# Patient Record
Sex: MALE | Race: WHITE | NOT HISPANIC OR LATINO | URBAN - METROPOLITAN AREA
[De-identification: names, ages, dates, MRNs, and addresses within clinical notes are randomized per-mention and may not be internally consistent; named-entity substitution may affect disease eponyms.]

---

## 2020-09-07 ENCOUNTER — EMERGENCY (EMERGENCY)
Facility: HOSPITAL | Age: 75
LOS: 1 days | Discharge: HOME | End: 2020-09-07
Attending: EMERGENCY MEDICINE
Payer: MEDICARE

## 2020-09-07 VITALS
HEART RATE: 74 BPM | OXYGEN SATURATION: 100 % | DIASTOLIC BLOOD PRESSURE: 86 MMHG | SYSTOLIC BLOOD PRESSURE: 135 MMHG | TEMPERATURE: 98 F | RESPIRATION RATE: 18 BRPM

## 2020-09-07 VITALS
HEART RATE: 92 BPM | OXYGEN SATURATION: 99 % | RESPIRATION RATE: 18 BRPM | WEIGHT: 145.06 LBS | DIASTOLIC BLOOD PRESSURE: 71 MMHG | HEIGHT: 66 IN | SYSTOLIC BLOOD PRESSURE: 125 MMHG

## 2020-09-07 LAB
ALBUMIN SERPL ELPH-MCNC: 4.1 G/DL — SIGNIFICANT CHANGE UP (ref 3.3–5.2)
ALP SERPL-CCNC: 63 U/L — SIGNIFICANT CHANGE UP (ref 40–120)
ALT FLD-CCNC: 7 U/L — SIGNIFICANT CHANGE UP
ANION GAP SERPL CALC-SCNC: 11 MMOL/L — SIGNIFICANT CHANGE UP (ref 5–17)
AST SERPL-CCNC: 13 U/L — SIGNIFICANT CHANGE UP
BILIRUB SERPL-MCNC: 0.3 MG/DL — LOW (ref 0.4–2)
BUN SERPL-MCNC: 17 MG/DL — SIGNIFICANT CHANGE UP (ref 8–20)
CALCIUM SERPL-MCNC: 9.2 MG/DL — SIGNIFICANT CHANGE UP (ref 8.6–10.2)
CHLORIDE SERPL-SCNC: 104 MMOL/L — SIGNIFICANT CHANGE UP (ref 98–107)
CO2 SERPL-SCNC: 25 MMOL/L — SIGNIFICANT CHANGE UP (ref 22–29)
CREAT SERPL-MCNC: 0.77 MG/DL — SIGNIFICANT CHANGE UP (ref 0.5–1.3)
GLUCOSE SERPL-MCNC: 101 MG/DL — HIGH (ref 70–99)
HCT VFR BLD CALC: 41.6 % — SIGNIFICANT CHANGE UP (ref 39–50)
HGB BLD-MCNC: 13.3 G/DL — SIGNIFICANT CHANGE UP (ref 13–17)
MCHC RBC-ENTMCNC: 30.6 PG — SIGNIFICANT CHANGE UP (ref 27–34)
MCHC RBC-ENTMCNC: 32 GM/DL — SIGNIFICANT CHANGE UP (ref 32–36)
MCV RBC AUTO: 95.6 FL — SIGNIFICANT CHANGE UP (ref 80–100)
PLATELET # BLD AUTO: 225 K/UL — SIGNIFICANT CHANGE UP (ref 150–400)
POTASSIUM SERPL-MCNC: 4.1 MMOL/L — SIGNIFICANT CHANGE UP (ref 3.5–5.3)
POTASSIUM SERPL-SCNC: 4.1 MMOL/L — SIGNIFICANT CHANGE UP (ref 3.5–5.3)
PROT SERPL-MCNC: 6.8 G/DL — SIGNIFICANT CHANGE UP (ref 6.6–8.7)
RBC # BLD: 4.35 M/UL — SIGNIFICANT CHANGE UP (ref 4.2–5.8)
RBC # FLD: 13.6 % — SIGNIFICANT CHANGE UP (ref 10.3–14.5)
SODIUM SERPL-SCNC: 140 MMOL/L — SIGNIFICANT CHANGE UP (ref 135–145)
WBC # BLD: 15.18 K/UL — HIGH (ref 3.8–10.5)
WBC # FLD AUTO: 15.18 K/UL — HIGH (ref 3.8–10.5)

## 2020-09-07 PROCEDURE — 82962 GLUCOSE BLOOD TEST: CPT

## 2020-09-07 PROCEDURE — 36415 COLL VENOUS BLD VENIPUNCTURE: CPT

## 2020-09-07 PROCEDURE — 80053 COMPREHEN METABOLIC PANEL: CPT

## 2020-09-07 PROCEDURE — 99284 EMERGENCY DEPT VISIT MOD MDM: CPT

## 2020-09-07 PROCEDURE — 71045 X-RAY EXAM CHEST 1 VIEW: CPT | Mod: 26

## 2020-09-07 PROCEDURE — 99283 EMERGENCY DEPT VISIT LOW MDM: CPT | Mod: 25

## 2020-09-07 PROCEDURE — 85027 COMPLETE CBC AUTOMATED: CPT

## 2020-09-07 PROCEDURE — 71045 X-RAY EXAM CHEST 1 VIEW: CPT

## 2020-09-07 PROCEDURE — 93005 ELECTROCARDIOGRAM TRACING: CPT

## 2020-09-07 PROCEDURE — 93010 ELECTROCARDIOGRAM REPORT: CPT

## 2020-09-07 RX ORDER — SODIUM CHLORIDE 9 MG/ML
1000 INJECTION INTRAMUSCULAR; INTRAVENOUS; SUBCUTANEOUS ONCE
Refills: 0 | Status: COMPLETED | OUTPATIENT
Start: 2020-09-07 | End: 2020-09-07

## 2020-09-07 RX ADMIN — SODIUM CHLORIDE 1000 MILLILITER(S): 9 INJECTION INTRAMUSCULAR; INTRAVENOUS; SUBCUTANEOUS at 20:05

## 2020-09-07 NOTE — ED ADULT NURSE NOTE - OBJECTIVE STATEMENT
patient received @ 2000, pt a&ox4, VSS, pt was biba after having a near syncopal event @ his friends house, pts daughter states "pt was smoking prior to episode." pt denies sob n/v/d, cp or ha. pt recently had a pacemaker placed, pacemaker interrogated no issues. pt placed on cardiac monitor/, labs snt per rx, poc discussed w/ daughter && patient, will continue to monitor.

## 2020-09-07 NOTE — ED PROVIDER NOTE - OBJECTIVE STATEMENT
75M h/o chronic back pain, heart block s/p [employer] PPM presents s/p syncopal episode. Was smoking marijuana outdoors with his friends and doesn't remember what happened. As per friends he stiffened up and eyes rolled back, was sitting the entire time. As per patient's daughter he "always passes out when smoking marijuana". Denies CP, SOB, dizziness, nausea, vomiting, fever, cough, incontinence, oral trauma, post-ictal state. Patient currently asymptomatic in usual state of health.

## 2020-09-07 NOTE — ED PROVIDER NOTE - PHYSICAL EXAMINATION
Gen: Well appearing in NAD  Head: NC/AT, no oral trauma  Neck: trachea midline  Resp:  No distress, CTAB  CV: RRR  GI: soft, NTND  Ext: no deformities, no calf ttp, No LE edema  Neuro:  A&O appears non focal, normal strength and sensation  Skin:  Warm and dry as visualized  Psych:  Normal affect and mood

## 2020-09-07 NOTE — ED PROVIDER NOTE - PROGRESS NOTE DETAILS
Rashmi BOWER: pacemaker interrogated, no events. Rashmi BOWER: Asymptomatic, VSS, will follow with cards.

## 2020-09-07 NOTE — ED PROVIDER NOTE - PATIENT PORTAL LINK FT
You can access the FollowMyHealth Patient Portal offered by [facility] by registering at the following website: [email or web address]. By joining [facility] FollowMyHealth portal, you will also be able to view your health information using other applications (apps) compatible with our system.

## 2020-09-07 NOTE — ED PROVIDER NOTE - CLINICAL SUMMARY MEDICAL DECISION MAKING FREE TEXT BOX
Pt with syncopal episode in setting of smoking marijuana, is at baseline now. Plan for labs, EKG, cxr, IVF, reass.

## 2020-09-07 NOTE — ED PROVIDER NOTE - NSFOLLOWUPINSTRUCTIONS_ED_ALL_ED_FT
- Follow up with your doctor within 2-3 days.   - Follow up with Cardiology, see information above.   - Bring results with you to the appointment.   - Return to the ED for any new or worsening symptoms.     Syncope    Syncope is when you temporarily lose consciousness, also called fainting or passing out. It is caused by a sudden decrease in blood flow to the brain. Even though most causes of syncope are not dangerous, syncope can possibly be a sign of a serious medical problem. Signs that you may be about to faint include feeling dizzy, lightheaded, nausea, visual changes, or cold/clammy skin. Do not drive, operate heavy machinery, or play sports until your health care provider says it is okay.    SEEK IMMEDIATE MEDICAL CARE IF YOU HAVE ANY OF THE FOLLOWING SYMPTOMS: severe headache, pain in your chest/abdomen/back, bleeding from your mouth or rectum, palpitations, shortness of breath, pain with breathing, seizure, confusion, or trouble walking.

## 2020-09-07 NOTE — ED ADULT TRIAGE NOTE - CHIEF COMPLAINT QUOTE
?Syncopal episode while at friends house, was sitting in chair when he began shaking but then awoke on his own.  Denies hx of seizures.  Pt states similar episode ~6 months ago leading him to have pacemaker placed.  Denies chest pain.  Respirations unlabored.  awake, alert and oriented x3 in triage.  #18 in L forearm infusing 300 cc NS by EMS.  As per EMS, pt with + orthostatic blood pressures on ambulance.

## 2020-12-01 NOTE — ED ADULT NURSE NOTE - CAS DISCH TRANSFER METHOD
61 y.o M with PMHx of DM2, HLD with intermittent N/V associated with bilateral lower quadrant pain, fever, elevated transaminases, total bili 3.9 and cholelithiasis, acute cholecystitis on CT A/P.    - Admit to Surgery  - Diet: NPO  - IVF  - Start IV antibiotics: Zosyn  - MRCP to evaluate for choledocholithiasis  - Pain and fever management: IV Tylenol  - F/u AM labs 61 y.o M with PMHx of DM2, HLD with intermittent N/V associated with bilateral lower quadrant pain, fever, elevated transaminases, total bili 3.9 and cholelithiasis, acute cholecystitis on CT A/P.    Plan:  - Admit to Surgery  - Diet: NPO  - IVF (NS for hyponatremia)  - Start IV antibiotics: Zosyn  - MRCP to evaluate for choledocholithiasis  - Pain and fever management: IV Tylenol  - lovenox for DVT ppx  - patient unsure of medications, pharmacy closed. Will need to call [facility] in [place] for details in AM  - F/u AM labs    Discussed with Dr. Casey Waller Team Surgery  [phone] 61 y.o M with PMHx of DM2, HLD with intermittent N/V associated with bilateral lower quadrant pain, fever, elevated transaminases, total bili 3.9 and cholelithiasis, acute cholecystitis on CT A/P.    Plan:  - Admit to Surgery  - Diet: NPO  - IVF (NS for hyponatremia)  - Start IV antibiotics: Zosyn  - will monitor closely given possibility of cholangitis, currently low suspicion  - MRCP to evaluate for choledocholithiasis  - Pain and fever management: IV Tylenol  - lovenox for DVT ppx  - patient unsure of medications, pharmacy closed. Will need to call [facility] in [place] for details in AM  - F/u AM labs    Discussed with Dr. Casey Waller Team Surgery  [phone] Private car

## 2022-08-24 NOTE — ED PROVIDER NOTE - NSFOLLOWUPCLINICS_GEN_ALL_ED_FT
[facility]  Cardiology  [address] [place] [address]  Phone: [phone]  Fax:   Follow Up Time: 4-6 Days
no